# Patient Record
Sex: MALE | Race: WHITE | URBAN - METROPOLITAN AREA
[De-identification: names, ages, dates, MRNs, and addresses within clinical notes are randomized per-mention and may not be internally consistent; named-entity substitution may affect disease eponyms.]

---

## 2022-11-23 ENCOUNTER — OFFICE (OUTPATIENT)
Dept: URBAN - METROPOLITAN AREA CLINIC 100 | Facility: CLINIC | Age: 67
Setting detail: OPHTHALMOLOGY
End: 2022-11-23
Payer: MEDICARE

## 2022-11-23 DIAGNOSIS — H35.033: ICD-10-CM

## 2022-11-23 DIAGNOSIS — H02.831: ICD-10-CM

## 2022-11-23 DIAGNOSIS — H43.393: ICD-10-CM

## 2022-11-23 DIAGNOSIS — H02.834: ICD-10-CM

## 2022-11-23 DIAGNOSIS — E11.9: ICD-10-CM

## 2022-11-23 DIAGNOSIS — H40.013: ICD-10-CM

## 2022-11-23 DIAGNOSIS — H25.13: ICD-10-CM

## 2022-11-23 DIAGNOSIS — H50.52: ICD-10-CM

## 2022-11-23 DIAGNOSIS — H11.153: ICD-10-CM

## 2022-11-23 PROCEDURE — 92133 CPTRZD OPH DX IMG PST SGM ON: CPT | Performed by: OPHTHALMOLOGY

## 2022-11-23 PROCEDURE — 92083 EXTENDED VISUAL FIELD XM: CPT | Performed by: OPHTHALMOLOGY

## 2022-11-23 PROCEDURE — 92014 COMPRE OPH EXAM EST PT 1/>: CPT | Performed by: OPHTHALMOLOGY

## 2022-11-23 ASSESSMENT — REFRACTION_CURRENTRX
OS_CYLINDER: -1.50
OD_OVR_VA: 20/
OS_ADD: +2.50
OS_ADD: +2.25
OD_AXIS: 97
OD_SPHERE: +2.50
OS_AXIS: 71
OS_SPHERE: +2.75
OD_ADD: +2.50
OS_AXIS: 74
OD_AXIS: 94
OS_HPRISM: 0.5
OS_VPRISM_DIRECTION: BF
OD_CYLINDER: -1.00
OS_SPHERE: +1.75
OD_VPRISM_DIRECTION: BF
OD_OVR_VA: 20/
OD_ADD: +2.50
OS_CYLINDER: -0.75
OD_SPHERE: +2.00
OD_CYLINDER: -0.75
OS_VPRISM_DIRECTION: BF
OS_OVR_VA: 20/
OD_HPRISM: 0.5
OD_VPRISM_DIRECTION: BF
OS_OVR_VA: 20/

## 2022-11-23 ASSESSMENT — REFRACTION_MANIFEST
OS_HPRISM: 2
OD_VPRISM_DIRECTION: BI
OS_SPHERE: +2.75
OD_VA1: 20/20-2
OD_AXIS: 090
OS_AXIS: 75
OD_ADD: +2.25
OD_HPRISM: 2
OD_CYLINDER: -1.00
OS_CYLINDER: -1.50
OS_VA1: 20/20
OS_ADD: +2.25
OS_VPRISM_DIRECTION: BI
OD_SPHERE: +2.50

## 2022-11-23 ASSESSMENT — LID POSITION - DERMATOCHALASIS
OD_DERMATOCHALASIS: 2+
OS_DERMATOCHALASIS: 2+

## 2022-11-23 ASSESSMENT — AXIALLENGTH_DERIVED
OS_AL: 22.0812
OS_AL: 22.21
OD_AL: 22.046
OD_AL: 22.0886

## 2022-11-23 ASSESSMENT — SPHEQUIV_DERIVED
OS_SPHEQUIV: 2
OD_SPHEQUIV: 2
OS_SPHEQUIV: 2.375
OD_SPHEQUIV: 2.125

## 2022-11-23 ASSESSMENT — PACHYMETRY
OD_CT_UM: 571
OS_CT_UM: 537
OS_CT_CORRECTION: 1
OD_CT_CORRECTION: -2

## 2022-11-23 ASSESSMENT — REFRACTION_AUTOREFRACTION
OS_CYLINDER: -0.75
OS_AXIS: 071
OD_SPHERE: +2.75
OD_CYLINDER: -1.25
OD_AXIS: 094
OS_SPHERE: +2.75

## 2022-11-23 ASSESSMENT — KERATOMETRY
OS_AXISANGLE_DEGREES: 149
OS_K1POWER_DIOPTERS: 45.25
METHOD_AUTO_MANUAL: AUTO
OD_AXISANGLE_DEGREES: 180
OS_K2POWER_DIOPTERS: 45.50
OD_K2POWER_DIOPTERS: 46.25
OD_K1POWER_DIOPTERS: 45.25

## 2022-11-23 ASSESSMENT — CONFRONTATIONAL VISUAL FIELD TEST (CVF)
OS_FINDINGS: FULL
OD_FINDINGS: FULL

## 2022-11-23 ASSESSMENT — TONOMETRY
OD_IOP_MMHG: 19
OS_IOP_MMHG: 19

## 2022-11-23 ASSESSMENT — VISUAL ACUITY
OS_BCVA: 20/25-2
OD_BCVA: 20/20-2

## 2023-04-20 ENCOUNTER — APPOINTMENT (OUTPATIENT)
Dept: ORTHOPEDIC SURGERY | Facility: CLINIC | Age: 68
End: 2023-04-20
Payer: MEDICARE

## 2023-04-20 VITALS
DIASTOLIC BLOOD PRESSURE: 76 MMHG | SYSTOLIC BLOOD PRESSURE: 129 MMHG | WEIGHT: 230 LBS | HEIGHT: 71 IN | HEART RATE: 66 BPM | BODY MASS INDEX: 32.2 KG/M2

## 2023-04-20 DIAGNOSIS — Z86.79 PERSONAL HISTORY OF OTHER DISEASES OF THE CIRCULATORY SYSTEM: ICD-10-CM

## 2023-04-20 DIAGNOSIS — Z78.9 OTHER SPECIFIED HEALTH STATUS: ICD-10-CM

## 2023-04-20 DIAGNOSIS — Z86.39 PERSONAL HISTORY OF OTHER ENDOCRINE, NUTRITIONAL AND METABOLIC DISEASE: ICD-10-CM

## 2023-04-20 DIAGNOSIS — Z56.0 UNEMPLOYMENT, UNSPECIFIED: ICD-10-CM

## 2023-04-20 DIAGNOSIS — Z86.69 PERSONAL HISTORY OF OTHER DISEASES OF THE NERVOUS SYSTEM AND SENSE ORGANS: ICD-10-CM

## 2023-04-20 PROCEDURE — 73560 X-RAY EXAM OF KNEE 1 OR 2: CPT | Mod: RT

## 2023-04-20 PROCEDURE — 99204 OFFICE O/P NEW MOD 45 MIN: CPT

## 2023-04-20 SDOH — ECONOMIC STABILITY - INCOME SECURITY: UNEMPLOYMENT, UNSPECIFIED: Z56.0

## 2023-04-21 PROBLEM — Z56.0 UNEMPLOYED: Status: ACTIVE | Noted: 2023-04-21

## 2023-04-21 PROBLEM — Z86.79 HISTORY OF HYPERTENSION: Status: RESOLVED | Noted: 2023-04-21 | Resolved: 2023-04-21

## 2023-04-21 PROBLEM — Z78.9 DENIES ALCOHOL CONSUMPTION: Status: ACTIVE | Noted: 2023-04-21

## 2023-04-21 PROBLEM — Z86.69 HISTORY OF NEUROPATHY: Status: RESOLVED | Noted: 2023-04-21 | Resolved: 2023-04-21

## 2023-04-21 PROBLEM — Z86.79 HISTORY OF CARDIAC DISORDER: Status: RESOLVED | Noted: 2023-04-21 | Resolved: 2023-04-21

## 2023-04-21 PROBLEM — Z86.39 HISTORY OF HIGH CHOLESTEROL: Status: RESOLVED | Noted: 2023-04-21 | Resolved: 2023-04-21

## 2023-04-21 PROBLEM — Z78.9 CURRENT NON-SMOKER: Status: ACTIVE | Noted: 2023-04-21

## 2023-04-21 RX ORDER — HYALURONATE SODIUM, STABILIZED 60 MG/3 ML
60 SYRINGE (ML) INTRAARTICULAR
Qty: 1 | Refills: 0 | Status: ACTIVE | OUTPATIENT
Start: 2023-04-21

## 2023-04-24 ENCOUNTER — NON-APPOINTMENT (OUTPATIENT)
Age: 68
End: 2023-04-24

## 2023-04-27 ENCOUNTER — APPOINTMENT (OUTPATIENT)
Dept: ORTHOPEDIC SURGERY | Facility: CLINIC | Age: 68
End: 2023-04-27
Payer: MEDICARE

## 2023-04-27 DIAGNOSIS — M17.11 UNILATERAL PRIMARY OSTEOARTHRITIS, RIGHT KNEE: ICD-10-CM

## 2023-04-27 PROCEDURE — 20610 DRAIN/INJ JOINT/BURSA W/O US: CPT | Mod: RT

## 2023-04-28 ENCOUNTER — TRANSCRIPTION ENCOUNTER (OUTPATIENT)
Age: 68
End: 2023-04-28

## 2023-05-02 PROBLEM — M17.11 PRIMARY OSTEOARTHRITIS OF RIGHT KNEE: Status: ACTIVE | Noted: 2023-04-20

## 2023-05-02 NOTE — PHYSICAL EXAM
[de-identified] : Right Knee: Range of Motion in Degrees	\par 	                  Claimant:	Normal:	\par Flexion Active	  135 	                135-degrees	\par Flexion Passive	  135	                135-degrees	\par Extension Active	  0-5	                0-5-degrees	\par Extension Passive	  0-5	                0-5-degrees	\par \par No weakness to flexion/extension. No evidence of instability in the AP plane or varus or valgus stress.  Negative  Lachman.  Negative pivot shift.  Negative anterior drawer test.  Negative posterior drawer test.  Negative Gerardo.  Negative Apley grind.  No medial or lateral joint line tenderness.  Positive tenderness over the medial and lateral facet of the patella.  Positive patellofemoral crepitations.  No lateral tilting patella.  No patella apprehension.  Positive crepitation in the medial and lateral femoral condyle.  No proximal or distal swelling, edema or tenderness.  No gross motor or sensory deficits. Mild intra-articular swelling.  2+ DP and PT pulses. No varus or valgus malalignment.  Skin is intact.  No rashes, scars or lesions. \par

## 2023-05-02 NOTE — ADDENDUM
[FreeTextEntry1] : This note was written by Marcello Pelaez on 05/02/2023, acting as a scribe for Olman Gatica III, MD

## 2023-05-02 NOTE — PROCEDURE
[de-identified] : \par Indication:   \par Osteoarthritis of right knee\par \par Consent:\par The risks and benefits of the procedure were discussed with the patient in detail.  Upon verbal consent of the patient, we proceeded with the Durolane injection as noted below.  \par \par Description of Procedure:\par After sterile prep, the patient underwent a Durolane injection of 60 mg of Sodium Hyaluronate in a 3.0 mL syringe into the right knee.  The patient tolerated the procedure well.  There were no complications.  \par \par :  [employer]\par NDC#:  [address]-7\par Lot#:    [id number]\par Expiration Date:  05/31/2025\par \par Plan:\par I have recommended ice and elevation.  The patient will be reassessed in six to eight weeks following this Durolane injection for the right knee osteoarthritis.\par \par  \par \par \par \par

## 2023-05-08 NOTE — CONSULT LETTER
[Dear  ___] : Dear  [unfilled], [Consult Letter:] : I had the pleasure of evaluating your patient, [unfilled]. [Please see my note below.] : Please see my note below. [Consult Closing:] : Thank you very much for allowing me to participate in the care of this patient.  If you have any questions, please do not hesitate to contact me. [Sincerely,] : Sincerely, [FreeTextEntry3] : Olman Gatica III, MD \par TOD/alexander\par

## 2023-05-08 NOTE — ADDENDUM
[FreeTextEntry1] : This note was written by Marcello Pelaez on 05/08/2023, acting as a scribe for Olman Gatica III, MD

## 2023-05-08 NOTE — HISTORY OF PRESENT ILLNESS
[de-identified] : The patient comes in today with complaints of pain to his right knee.  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  This injury is not work related or due to an automobile accident.  The patient states the pain is constant.   The patient describes the pain as sharp. [8] : a current pain level of 8/10 [de-identified] : Walking and climbing stairs [de-identified] : Staying off leg [4] : the relief from treatment is 4/10 [7] : the ailment interference is 7/10 [9] : the ailment interference is 9/10 [(Does not interfere) 0] : the ailment interference is 0/10 (does not interfere) [] : Yes [de-identified] : Knee brace [de-identified] : Knee gives out.

## 2023-05-08 NOTE — DISCUSSION/SUMMARY
[de-identified] : At this time, due to right knee osteoarthritis, I recommended he undergo a course of viscosupplementation.  He will be scheduled at this time.\par

## 2023-05-08 NOTE — REVIEW OF SYSTEMS
[Joint Swelling] : joint swelling [Joint Pain] : joint pain [Muscle Weakness] : muscle weakness [Negative] : Heme/Lymph

## 2023-05-08 NOTE — PHYSICAL EXAM
[de-identified] : Right Knee: Range of Motion in Degrees	\par 	                  Claimant:	Normal:	\par Flexion Active	  135 	                135-degrees	\par Flexion Passive	  135	                135-degrees	\par Extension Active	  0-5	                0-5-degrees	\par Extension Passive	  0-5	                0-5-degrees	\par \par No weakness to flexion/extension. No evidence of instability in the AP plane or varus or valgus stress.  Negative  Lachman.  Negative pivot shift.  Negative anterior drawer test.  Negative posterior drawer test.  Negative Gerardo.  Negative Apley grind.  No medial or lateral joint line tenderness.  Positive tenderness over the medial and lateral facet of the patella.  Positive patellofemoral crepitations.  No lateral tilting patella.  No patella apprehension.  Positive crepitation in the medial and lateral femoral condyle.  No proximal or distal swelling, edema or tenderness.  No gross motor or sensory deficits. Mild intra-articular swelling.  2+ DP and PT pulses. No varus or valgus malalignment.  Skin is intact.  No rashes, scars or lesions.\par \par Left Knee: Range of Motion in Degrees\par 	\par 	                  Claimant:    Normal:	\par Flexion Active	    135 	    135-degrees	\par Flexion Passive	    135	    135-degrees	\par Extension Active	    0-5	    0-5-degrees	\par Extension Passive	    0-5	    0-5-degrees	\par \par Well-healed scar.  No instability.  \par  [de-identified] : Gait and Station:  Ambulating with a slightly antalgic to antalgic gait.  Normal Station.  [de-identified] : Appearance:  Well developed, well-nourished male in no acute distress.\par   [de-identified] : Radiographs, one to two views of the right knee taken in the office today, show early severe arthritic changes.\par \par

## 2023-06-08 ENCOUNTER — APPOINTMENT (OUTPATIENT)
Dept: ORTHOPEDIC SURGERY | Facility: CLINIC | Age: 68
End: 2023-06-08

## 2023-07-05 ENCOUNTER — OFFICE (OUTPATIENT)
Dept: URBAN - METROPOLITAN AREA CLINIC 12 | Facility: CLINIC | Age: 68
Setting detail: OPHTHALMOLOGY
End: 2023-07-05
Payer: MEDICARE

## 2023-07-05 VITALS — HEIGHT: 60 IN

## 2023-07-05 DIAGNOSIS — H25.13: ICD-10-CM

## 2023-07-05 DIAGNOSIS — H02.831: ICD-10-CM

## 2023-07-05 DIAGNOSIS — H43.393: ICD-10-CM

## 2023-07-05 DIAGNOSIS — H53.10: ICD-10-CM

## 2023-07-05 DIAGNOSIS — E11.9: ICD-10-CM

## 2023-07-05 DIAGNOSIS — H50.52: ICD-10-CM

## 2023-07-05 DIAGNOSIS — H11.153: ICD-10-CM

## 2023-07-05 DIAGNOSIS — H35.033: ICD-10-CM

## 2023-07-05 DIAGNOSIS — H40.013: ICD-10-CM

## 2023-07-05 DIAGNOSIS — H02.834: ICD-10-CM

## 2023-07-05 PROCEDURE — 92020 GONIOSCOPY: CPT | Performed by: STUDENT IN AN ORGANIZED HEALTH CARE EDUCATION/TRAINING PROGRAM

## 2023-07-05 PROCEDURE — 92083 EXTENDED VISUAL FIELD XM: CPT | Performed by: STUDENT IN AN ORGANIZED HEALTH CARE EDUCATION/TRAINING PROGRAM

## 2023-07-05 PROCEDURE — 92133 CPTRZD OPH DX IMG PST SGM ON: CPT | Performed by: STUDENT IN AN ORGANIZED HEALTH CARE EDUCATION/TRAINING PROGRAM

## 2023-07-05 PROCEDURE — 92014 COMPRE OPH EXAM EST PT 1/>: CPT | Performed by: STUDENT IN AN ORGANIZED HEALTH CARE EDUCATION/TRAINING PROGRAM

## 2023-07-05 ASSESSMENT — VISUAL ACUITY
OS_BCVA: 20/25-3
OD_BCVA: 20/20-1

## 2023-07-05 ASSESSMENT — TONOMETRY
OS_IOP_MMHG: 17
OD_IOP_MMHG: 16

## 2023-07-05 ASSESSMENT — REFRACTION_CURRENTRX
OD_OVR_VA: 20/
OS_VPRISM_DIRECTION: BF
OS_SPHERE: +2.75
OS_ADD: +2.25
OD_CYLINDER: -0.75
OS_AXIS: 71
OS_HPRISM: 0.5
OD_ADD: +2.50
OS_OVR_VA: 20/
OS_ADD: +2.50
OD_AXIS: 97
OS_SPHERE: +1.75
OD_ADD: +2.50
OD_VPRISM_DIRECTION: BF
OS_CYLINDER: -1.50
OS_OVR_VA: 20/
OD_VPRISM_DIRECTION: BF
OS_VPRISM_DIRECTION: BF
OS_CYLINDER: -0.75
OS_AXIS: 74
OD_HPRISM: 0.5
OD_OVR_VA: 20/
OD_SPHERE: +2.50
OD_AXIS: 94
OD_CYLINDER: -1.00
OD_SPHERE: +2.00

## 2023-07-05 ASSESSMENT — REFRACTION_MANIFEST
OD_VA1: 20/20-2
OD_VPRISM_DIRECTION: BI
OD_CYLINDER: -1.00
OS_SPHERE: +2.75
OD_AXIS: 090
OS_VPRISM_DIRECTION: BI
OS_AXIS: 75
OD_SPHERE: +2.50
OS_CYLINDER: -1.50
OS_HPRISM: 2
OS_ADD: +2.25
OD_ADD: +2.25
OS_VA1: 20/20
OD_HPRISM: 2

## 2023-07-05 ASSESSMENT — CONFRONTATIONAL VISUAL FIELD TEST (CVF)
OS_FINDINGS: FULL
OD_FINDINGS: FULL

## 2023-07-05 ASSESSMENT — KERATOMETRY
METHOD_AUTO_MANUAL: AUTO
OS_K1POWER_DIOPTERS: 45.25
OD_AXISANGLE_DEGREES: 180
OD_K1POWER_DIOPTERS: 45.25
OD_K2POWER_DIOPTERS: 46.25
OS_AXISANGLE_DEGREES: 137
OS_K2POWER_DIOPTERS: 45.50

## 2023-07-05 ASSESSMENT — REFRACTION_AUTOREFRACTION
OD_SPHERE: +3.00
OD_CYLINDER: -1.25
OS_AXIS: 074
OS_CYLINDER: -1.00
OD_AXIS: 089
OS_SPHERE: +2.75

## 2023-07-05 ASSESSMENT — AXIALLENGTH_DERIVED
OD_AL: 22.09
OS_AL: 22.124
OS_AL: 22.21
OD_AL: 21.96

## 2023-07-05 ASSESSMENT — SPHEQUIV_DERIVED
OD_SPHEQUIV: 2
OD_SPHEQUIV: 2.375
OS_SPHEQUIV: 2
OS_SPHEQUIV: 2.25

## 2023-07-05 ASSESSMENT — LID POSITION - DERMATOCHALASIS
OD_DERMATOCHALASIS: 2+
OS_DERMATOCHALASIS: 2+

## 2023-07-05 ASSESSMENT — PACHYMETRY
OS_CT_CORRECTION: 1
OS_CT_UM: 537
OD_CT_CORRECTION: -2
OD_CT_UM: 571

## 2023-11-29 ENCOUNTER — OFFICE (OUTPATIENT)
Dept: URBAN - METROPOLITAN AREA CLINIC 100 | Facility: CLINIC | Age: 68
Setting detail: OPHTHALMOLOGY
End: 2023-11-29
Payer: MEDICARE

## 2023-11-29 DIAGNOSIS — H52.4: ICD-10-CM

## 2023-11-29 DIAGNOSIS — H50.52: ICD-10-CM

## 2023-11-29 DIAGNOSIS — H11.153: ICD-10-CM

## 2023-11-29 PROCEDURE — 92015 DETERMINE REFRACTIVE STATE: CPT | Performed by: OPHTHALMOLOGY

## 2023-11-29 PROCEDURE — 92060 SENSORIMOTOR EXAMINATION: CPT | Performed by: OPHTHALMOLOGY

## 2023-11-29 PROCEDURE — 99213 OFFICE O/P EST LOW 20 MIN: CPT | Performed by: OPHTHALMOLOGY

## 2023-11-29 ASSESSMENT — REFRACTION_CURRENTRX
OD_HPRISM: 0.5
OD_SPHERE: +2.00
OS_AXIS: 74
OS_VPRISM_DIRECTION: BF
OD_ADD: +2.50
OD_ADD: +2.25
OS_ADD: +2.25
OS_SPHERE: +1.75
OD_OVR_VA: 20/
OS_ADD: +2.50
OS_OVR_VA: 20/
OS_OVR_VA: 20/
OD_SPHERE: +2.50
OS_AXIS: 072
OS_SPHERE: +2.75
OD_VPRISM_DIRECTION: BF
OD_CYLINDER: -0.75
OS_CYLINDER: -0.75
OD_VPRISM_DIRECTION: BF
OS_HPRISM: 0.5
OS_VPRISM_DIRECTION: BF
OD_AXIS: 97
OD_OVR_VA: 20/
OS_CYLINDER: -1.50
OD_CYLINDER: -1.00
OD_AXIS: 089

## 2023-11-29 ASSESSMENT — REFRACTION_AUTOREFRACTION
OD_CYLINDER: -1.25
OS_AXIS: 079
OS_SPHERE: +3.00
OD_AXIS: 093
OD_SPHERE: +3.00
OS_CYLINDER: -0.75

## 2023-11-29 ASSESSMENT — CONFRONTATIONAL VISUAL FIELD TEST (CVF)
OD_FINDINGS: FULL
OS_FINDINGS: FULL

## 2023-11-29 ASSESSMENT — REFRACTION_MANIFEST
OD_VA1: 20/25-1
OD_SPHERE: +2.75
OS_SPHERE: +2.75
OD_CYLINDER: -1.25
OU_VA: 20/20
OD_VPRISM_DIRECTION: BI
OD_AXIS: 095
OD_ADD: +2.50
OS_AXIS: 080
OS_VPRISM_DIRECTION: BI
OS_VA1: 20/20
OS_HPRISM: 2
OD_HPRISM: 2
OS_CYLINDER: -0.75
OS_ADD: +2.50

## 2023-11-29 ASSESSMENT — SPHEQUIV_DERIVED
OS_SPHEQUIV: 2.375
OD_SPHEQUIV: 2.375
OD_SPHEQUIV: 2.125
OS_SPHEQUIV: 2.625

## 2023-11-29 ASSESSMENT — LID POSITION - DERMATOCHALASIS
OD_DERMATOCHALASIS: 2+
OS_DERMATOCHALASIS: 2+

## 2024-05-28 NOTE — REASON FOR VISIT
diabetes mellitus without complication, without long-term current use of insulin (HCC)     Encounter for smoking cessation counseling            [Initial Visit] : an initial visit for [FreeTextEntry2] : his right knee

## 2024-08-16 PROBLEM — H01.002 BLEPHARITIS; RIGHT LOWER LID, LEFT LOWER LID , LEFT UPPER LID, RIGHT UPPER LID: Status: ACTIVE | Noted: 2024-08-16

## 2024-08-16 PROBLEM — H01.004 BLEPHARITIS; RIGHT LOWER LID, LEFT LOWER LID , LEFT UPPER LID, RIGHT UPPER LID: Status: ACTIVE | Noted: 2024-08-16

## 2024-08-16 PROBLEM — H01.005 BLEPHARITIS; RIGHT LOWER LID, LEFT LOWER LID , LEFT UPPER LID, RIGHT UPPER LID: Status: ACTIVE | Noted: 2024-08-16

## 2024-08-16 PROBLEM — H01.001 BLEPHARITIS; RIGHT LOWER LID, LEFT LOWER LID , LEFT UPPER LID, RIGHT UPPER LID: Status: ACTIVE | Noted: 2024-08-16

## 2025-04-02 ENCOUNTER — OFFICE (OUTPATIENT)
Dept: URBAN - METROPOLITAN AREA CLINIC 100 | Facility: CLINIC | Age: 70
Setting detail: OPHTHALMOLOGY
End: 2025-04-02
Payer: MEDICARE

## 2025-04-02 DIAGNOSIS — H40.033: ICD-10-CM

## 2025-04-02 DIAGNOSIS — H11.153: ICD-10-CM

## 2025-04-02 DIAGNOSIS — H50.52: ICD-10-CM

## 2025-04-02 DIAGNOSIS — H25.13: ICD-10-CM

## 2025-04-02 DIAGNOSIS — H40.013: ICD-10-CM

## 2025-04-02 DIAGNOSIS — H02.834: ICD-10-CM

## 2025-04-02 DIAGNOSIS — H02.831: ICD-10-CM

## 2025-04-02 PROCEDURE — 99213 OFFICE O/P EST LOW 20 MIN: CPT | Performed by: OPHTHALMOLOGY

## 2025-04-02 PROCEDURE — 92020 GONIOSCOPY: CPT | Performed by: OPHTHALMOLOGY

## 2025-04-02 PROCEDURE — 92083 EXTENDED VISUAL FIELD XM: CPT | Performed by: OPHTHALMOLOGY

## 2025-04-02 PROCEDURE — 92133 CPTRZD OPH DX IMG PST SGM ON: CPT | Performed by: OPHTHALMOLOGY

## 2025-04-02 ASSESSMENT — REFRACTION_CURRENTRX
OD_ADD: +2.25
OD_HPRISM: 0.5
OD_OVR_VA: 20/
OS_AXIS: 072
OD_VPRISM_DIRECTION: BF
OD_OVR_VA: 20/
OS_VPRISM_DIRECTION: BF
OS_SPHERE: +2.75
OD_ADD: +2.50
OS_ADD: +2.50
OD_SPHERE: +2.00
OS_ADD: +2.25
OD_VPRISM_DIRECTION: BF
OS_CYLINDER: -1.50
OS_AXIS: 74
OD_AXIS: 089
OS_VPRISM_DIRECTION: BF
OS_OVR_VA: 20/
OS_OVR_VA: 20/
OS_CYLINDER: -0.75
OD_CYLINDER: -0.75
OS_SPHERE: +1.75
OD_AXIS: 97
OD_CYLINDER: -1.00
OD_SPHERE: +2.50
OS_HPRISM: 0.5

## 2025-04-02 ASSESSMENT — REFRACTION_MANIFEST
OD_VPRISM_DIRECTION: BI
OS_AXIS: 080
OD_HPRISM: 2
OS_CYLINDER: -0.75
OD_SPHERE: +2.75
OD_ADD: +2.50
OD_VA1: 20/25-1
OS_VPRISM_DIRECTION: BI
OS_ADD: +2.50
OD_CYLINDER: -1.25
OS_VA1: 20/20
OU_VA: 20/20
OS_SPHERE: +2.75
OD_AXIS: 095
OS_HPRISM: 2

## 2025-04-02 ASSESSMENT — TONOMETRY
OS_IOP_MMHG: 16
OD_IOP_MMHG: 16

## 2025-04-02 ASSESSMENT — PACHYMETRY
OD_CT_CORRECTION: -2
OS_CT_UM: 537
OS_CT_CORRECTION: 1
OD_CT_UM: 571

## 2025-04-02 ASSESSMENT — CONFRONTATIONAL VISUAL FIELD TEST (CVF)
OS_FINDINGS: FULL
OD_FINDINGS: FULL

## 2025-04-02 ASSESSMENT — KERATOMETRY: METHOD_AUTO_MANUAL: AUTO

## 2025-04-02 ASSESSMENT — VISUAL ACUITY
OS_BCVA: 20/30
OD_BCVA: 20/30

## 2025-04-02 ASSESSMENT — LID POSITION - DERMATOCHALASIS
OD_DERMATOCHALASIS: 2+
OS_DERMATOCHALASIS: 2+

## 2025-05-01 ENCOUNTER — OFFICE (OUTPATIENT)
Dept: URBAN - METROPOLITAN AREA CLINIC 12 | Facility: CLINIC | Age: 70
Setting detail: OPHTHALMOLOGY
End: 2025-05-01
Payer: MEDICARE

## 2025-05-01 DIAGNOSIS — H02.831: ICD-10-CM

## 2025-05-01 DIAGNOSIS — H40.003: ICD-10-CM

## 2025-05-01 DIAGNOSIS — H02.834: ICD-10-CM

## 2025-05-01 PROBLEM — H50.52 EXOPHORIA ; BOTH EYES: Status: ACTIVE | Noted: 2025-04-02

## 2025-05-01 PROCEDURE — 99213 OFFICE O/P EST LOW 20 MIN: CPT | Performed by: STUDENT IN AN ORGANIZED HEALTH CARE EDUCATION/TRAINING PROGRAM

## 2025-05-01 ASSESSMENT — REFRACTION_MANIFEST
OS_ADD: +2.50
OD_VPRISM_DIRECTION: BI
OS_AXIS: 080
OD_HPRISM: 2
OD_ADD: +2.50
OS_SPHERE: +2.75
OS_HPRISM: 2
OD_SPHERE: +2.75
OD_CYLINDER: -1.25
OU_VA: 20/20
OS_VA1: 20/20
OD_AXIS: 095
OS_VPRISM_DIRECTION: BI
OD_VA1: 20/25-1
OS_CYLINDER: -0.75

## 2025-05-01 ASSESSMENT — REFRACTION_AUTOREFRACTION
OS_AXIS: 077
OD_SPHERE: +3.75
OD_AXIS: 095
OS_CYLINDER: -0.75
OS_SPHERE: +2.75
OD_CYLINDER: -2.00

## 2025-05-01 ASSESSMENT — REFRACTION_CURRENTRX
OS_CYLINDER: -0.75
OS_SPHERE: +2.75
OS_VPRISM_DIRECTION: BF
OD_HPRISM: 0.5
OS_ADD: +2.50
OS_VPRISM_DIRECTION: BF
OS_AXIS: 74
OD_AXIS: 089
OD_OVR_VA: 20/
OD_SPHERE: +2.50
OS_CYLINDER: -1.50
OD_ADD: +2.25
OD_CYLINDER: -1.00
OS_HPRISM: 0.5
OS_OVR_VA: 20/
OD_ADD: +2.50
OD_VPRISM_DIRECTION: BF
OD_SPHERE: +2.00
OD_CYLINDER: -0.75
OD_OVR_VA: 20/
OS_ADD: +2.25
OD_AXIS: 97
OS_SPHERE: +1.75
OS_OVR_VA: 20/
OS_AXIS: 072
OD_VPRISM_DIRECTION: BF

## 2025-05-01 ASSESSMENT — KERATOMETRY
OS_AXISANGLE_DEGREES: 146
OS_K1POWER_DIOPTERS: 45.00
OS_K2POWER_DIOPTERS: 45.75
OD_K1POWER_DIOPTERS: 45.25
OD_AXISANGLE_DEGREES: 002
OD_K2POWER_DIOPTERS: 46.75
METHOD_AUTO_MANUAL: AUTO

## 2025-05-01 ASSESSMENT — CONFRONTATIONAL VISUAL FIELD TEST (CVF)
OD_FINDINGS: FULL
OS_FINDINGS: FULL

## 2025-05-01 ASSESSMENT — VISUAL ACUITY
OD_BCVA: 20/25-2
OS_BCVA: 20/20-2

## 2025-05-01 ASSESSMENT — LID POSITION - DERMATOCHALASIS
OS_DERMATOCHALASIS: 2+
OD_DERMATOCHALASIS: 2+

## 2025-05-01 ASSESSMENT — TONOMETRY
OS_IOP_MMHG: 16
OD_IOP_MMHG: 17

## 2025-07-02 PROBLEM — H01.011 BLEPHARITIS; RIGHT UPPER LID, RIGHT LOWER LID, LEFT UPPER LID, LEFT LOWER LID: Status: ACTIVE | Noted: 2025-07-02

## 2025-07-02 PROBLEM — Z96.1 PSEUDOPHAKIA ; LEFT EYE: Status: ACTIVE | Noted: 2025-07-02

## 2025-07-02 PROBLEM — H01.012 BLEPHARITIS; RIGHT UPPER LID, RIGHT LOWER LID, LEFT UPPER LID, LEFT LOWER LID: Status: ACTIVE | Noted: 2025-07-02

## 2025-07-02 PROBLEM — H01.014 BLEPHARITIS; RIGHT UPPER LID, RIGHT LOWER LID, LEFT UPPER LID, LEFT LOWER LID: Status: ACTIVE | Noted: 2025-07-02

## 2025-07-02 PROBLEM — H01.015 BLEPHARITIS; RIGHT UPPER LID, RIGHT LOWER LID, LEFT UPPER LID, LEFT LOWER LID: Status: ACTIVE | Noted: 2025-07-02
